# Patient Record
Sex: MALE | Race: WHITE | NOT HISPANIC OR LATINO | Employment: STUDENT | URBAN - METROPOLITAN AREA
[De-identification: names, ages, dates, MRNs, and addresses within clinical notes are randomized per-mention and may not be internally consistent; named-entity substitution may affect disease eponyms.]

---
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
## 2025-03-08 ENCOUNTER — HEALTH MAINTENANCE LETTER (OUTPATIENT)
Age: 20
End: 2025-03-08

## 2025-06-22 ENCOUNTER — HEALTH MAINTENANCE LETTER (OUTPATIENT)
Age: 20
End: 2025-06-22

## 2025-07-11 NOTE — PATIENT INSTRUCTIONS
Thank you for choosing [facility].     Gino Victor MD    It was a pleasure talking to you today! This visit note is available to you in [employer]. If you see any errors or changes/additions you would like me to make to the note please let me know.    So nice seeing you in person.  You guys are doing such a great job. I do see some areas where we can tighten things up:  1.  Please work on waiting 15 minutes after insulin before eating.  2.  Please enter accurate carbs.  Take out the measuring cups to make sure you are getting the amount you think you are getting.  3.  Work on the evenings, that tends to be a trickier time for you.  4.  I'm seeing the pump kicking in between midnight and 5am---if after you are sure his numbers are good in the evening he still continues to need extra insulin overnight, we should go up on his nighttime basal.  5.  Annual studies today---lipids, urine albumin, vit D    YOUR INSULIN DOSE IS:  Tandem Control IQ settings  Active insulin ---  Start Time Basal Rate Sensitivity Carb Ratio Target    Midnight  0.6 u/hr  1u: 40  1u: 10  100 mg/dl                              We recommend checking blood sugars 4-6 times per day, every day or using a sensor  Goal blood sugars:   fasting,  pre-meal, <180 2 hours after a meal.  (Higher fasting and bedtime numbers may be targeted for children under 5 yearsof age.)    Follow up in 3 months.    SCREENING RELATIVES FOR TYPE 1 DIABETES  As we are all currently homebound, this is a perfect time for T1D family members to get capillary autoantibody screenings through [employer].  It is quick, easy and can be done from the comfort of home.    Why screen now?  Autoantibody positive relatives of people with T1D may be eligible for prevention trials (studies to stop or delay progression to clinical diabetes).  While our clinical trials are on hold right now, we hope to resume them this summer. Screening positive for autoantibodies  right now puts subjects on a list for possible study inclusion once we are up and running again. There are a number of prevention and new onset studies ready to begin as soon as COVID-19 research restrictions are lifted.    Who is eligible to be screened?    Age 2.5 to 45 years and a sibling, offspring, or parent of an individual with type 1 diabetes    Age 2.5 to 20 years and a niece, nephew, aunt, uncle, grandchild, cousin, or half sibling of an individual with type 1 diabetes  How does remote capillary screening work?     There is a [employer] screening website where you can sign-up, consent online, and request an at-home kit.    The website is [email or web address]     [employer] will mail you a kit including instructions and all the necessary materials.     The test requires about 10-12 drops of blood.     The kit includes instructions to ship the sample back via [employer] within 24 hours of collection. There is a number to arrange free home pick-up by [employer].    Sick Day Plan:  Pump Failure:  IF YOUR PUMP FAILS AND YOU NEED TO TAKE BASAL INSULIN (GLARGINE, BASAGLAR, TRESIBA, LEVEMIR) THE DOSE IS: 14 units  Remember when you restart your pump that the basal insulin lasts 24 hours so wait until 24 hours is up before starting your pump basal rate.Call on-call endocrinologist or diabetes nurse if this happens. You should also plan to call the pump company right away to troubleshoot the pump failure.    Hyperglycemia (high blood glucose):  Ketones:  Check urine/blood ketones if Ulises is sick, vomiting, or if blood glucose is above 240 twice in a row. Call on-call endocrinologist or diabetes nurse if ketones are present.    Hypoglycemia (low blood glucose):  If blood glucose is 60 to 80:  1.  Eat or drink 1 carb unit (15 grams carbohydrate).   One carb unit equals:   - 1/2 cup (4 ounces) juice or regular soda pop, or   - 1 cup (8 ounces) milk, or   - 3 to 4 glucose tablets  2.  Re-check your blood glucose in 15  minutes.  3.  Repeat these steps every 15 minutes until your blood glucose is above 100.    If blood glucose is under 60:  1.  Eat or drink 2 carb units (30 grams carbohydrate).  Two carb units equal:   - 1 cup (8 ounces) juice or regular soda pop, or   - 2 cups (16 ounces) milk, or   - 6 to 8 glucose tablets.  2.  Re-check your blood glucose in 15 minutes.  3.  Repeat these steps every 15 minutes until your blood glucose is above 100.      If you had any blood work, imaging or other tests:  Normal test results will be mailed to your home address in a letter.  Abnormal results will be communicated to you via phone call / letter.  Please allow 2 weeks for processing/interpretation of most lab work.  For urgent issues that cannot wait until the next business day, call [phone] and ask for the Pediatric Endocrinologist on call.    You may contact the diabetes nurses with any questions at [phone].  Jamaica Mares RN, BSN; Maria Esther Lane RN; or Kayla Wynne RN, BAN may answer, depending on the day. Calls will be returned as soon as possible.      Medication renewal requests must be faxed to the main office by your pharmacy.  Allow 3-4 days for completion.   Main Office: [phone]  Fax: [phone]    Scheduling:    Pediatric Call Center for [facility], [phone]  [facility] [address], 9th floor [phone]  Infusion Center: [phone] (for stimulation tests)  Radiology/ [phone]     Services:   [phone]     We encourage you to sign up for [email or web address] for easy communication with us.  Sign up at the clinic  or go to [email or web address].     Please try the [facility] to [facility]) phone application for Virtual Tours, Procedure Preparation, Resources, Preparation for Hospital Stay and the Coloring Board.    No indicators present

## 2025-07-13 ENCOUNTER — HEALTH MAINTENANCE LETTER (OUTPATIENT)
Age: 20
End: 2025-07-13